# Patient Record
Sex: FEMALE | Race: OTHER | URBAN - METROPOLITAN AREA
[De-identification: names, ages, dates, MRNs, and addresses within clinical notes are randomized per-mention and may not be internally consistent; named-entity substitution may affect disease eponyms.]

---

## 2017-04-27 ENCOUNTER — OFFICE VISIT (OUTPATIENT)
Dept: FAMILY MEDICINE CLINIC | Age: 57
End: 2017-04-27

## 2017-04-27 VITALS
HEIGHT: 57 IN | SYSTOLIC BLOOD PRESSURE: 147 MMHG | HEART RATE: 65 BPM | DIASTOLIC BLOOD PRESSURE: 70 MMHG | TEMPERATURE: 98.4 F | WEIGHT: 117 LBS | BODY MASS INDEX: 25.24 KG/M2 | OXYGEN SATURATION: 99 %

## 2017-04-27 DIAGNOSIS — H04.121 DRY EYE OF RIGHT SIDE: Primary | ICD-10-CM

## 2017-04-27 DIAGNOSIS — S20.20XA CONTUSION OF THORACIC WALL, UNSPECIFIED AREA OF THORACIC WALL, INITIAL ENCOUNTER: ICD-10-CM

## 2017-04-27 RX ORDER — METHOCARBAMOL 500 MG/1
500 TABLET, FILM COATED ORAL 3 TIMES DAILY
Qty: 12 TAB | Refills: 1 | Status: SHIPPED | OUTPATIENT
Start: 2017-04-27

## 2017-04-27 RX ORDER — TETRAHYDROZOLINE HCL 0.05 %
1 DROPS OPHTHALMIC (EYE) 4 TIMES DAILY
Qty: 1 BOTTLE | Refills: 1 | Status: SHIPPED | OUTPATIENT
Start: 2017-04-27

## 2017-04-27 RX ORDER — NAPROXEN 500 MG/1
500 TABLET ORAL 2 TIMES DAILY WITH MEALS
Qty: 60 TAB | Refills: 0 | Status: SHIPPED | OUTPATIENT
Start: 2017-04-27

## 2017-04-27 NOTE — PROGRESS NOTES
Assessment/Plan:    Fadumo Willams was seen today for   Diagnoses and all orders for this visit:    Dry eye of right side  -     tetrahydrozoline (OPTI-CLEAR) 0.05 % ophthalmic solution; Administer 1 Drop to both eyes four (4) times daily. Contusion of thoracic wall, unspecified area of thoracic wall, initial encounter  -     methocarbamol (ROBAXIN) 500 mg tablet; Take 1 Tab by mouth three (3) times daily. [place] 1 pastilla 3 veces al monico por sims dolor muscular  -     naproxen (NAPROSYN) 500 mg tablet; Take 1 Tab by mouth two (2) times daily (with meals). [place] 1 pastilla 2 veces al monico con comida por sims dolor        Follow-up Disposition:  Return if symptoms worsen or fail to improve. ADRIANA Lemus expressed understanding of this plan. An AVS was printed and given to the patient.      ----------------------------------------------------------------------    Chief Complaint   Patient presents with    Neck Injury     pt c/o hitting the back of her neck with window    Other     pt c/o not being able close her right eye at night, pt has hx of facial paralysis       History of Present Illness:  Pt here for 2 reasons:  1. She feels that her right eye is not closing at night all the way and in the morning she has eye dryness. She does have remote hx of facial paralysis but she has NO acute sxs or signs of facial palsy and she agrees that this is not at all how she presented before. 2. 2 weeks ago, she was leaning back on her bed and she hit her upper t-spine on the edge of her window sill. Since then, she has had pain at this site and sometimes it seem that the pain goes to her anterior chest wall. She has been taking diclofenac with decent response. No pain with complete range of motion to her arms and neck        No past medical history on file.     Current Outpatient Prescriptions   Medication Sig Dispense Refill    methocarbamol (ROBAXIN) 500 mg tablet Take 1 Tab by mouth three (3) times daily. [place] 1 pastilla 3 veces al monico por sims dolor muscular 12 Tab 1    naproxen (NAPROSYN) 500 mg tablet Take 1 Tab by mouth two (2) times daily (with meals). [place] 1 pastilla 2 veces al monico con comida por sims dolor 60 Tab 0    tetrahydrozoline (OPTI-CLEAR) 0.05 % ophthalmic solution Administer 1 Drop to both eyes four (4) times daily. 1 Bottle 1       Allergies   Allergen Reactions    Pcn [Penicillins] Hives       Social History   Substance Use Topics    Smoking status: Never Smoker    Smokeless tobacco: None    Alcohol use No       No family history on file. Physical Exam:     Visit Vitals    /70 (BP 1 Location: Left arm)    Pulse 65    Temp 98.4 °F (36.9 °C) (Oral)    Ht 4' 9.2\" (1.453 m)    Wt 117 lb (53.1 kg)    SpO2 99%    BMI 25.14 kg/m2     gen pt looks well  A&Ox3  WDWN NAD  Respirations normal and non labored  Neuro: pt has NO facial asymmetry. She makes a perfect smile, she has no droop, her eyes are symmetrical, the eyelids do not lag on either eye. She has intact sensation and CN 2-12 grossly intact. Additionally, her eye has no redness or pus  Her upper t-spine is examined and she has no bruising or redness or warmth. She has tenderness, mild, with palpation over t-spine. No specific area of tenderness. She has no pain with neck or shoulder range of motion.  She has no anterior chest pain on palpation

## 2017-04-27 NOTE — PROGRESS NOTES
Printed AVS, provided to pt and reviewed. Pt indicated understanding and had no questions. Told pt that rx's have been sent to pharmacy and they should be ready for  in approximately 2 hrs. Told pt that rx's have been sent to pharmacy and they should be ready for  in approximately 2 hrs. Reviewed all medications ordered today with the pt. Lisha Daly was the .   Oumar Rowan RN

## 2017-04-27 NOTE — PROGRESS NOTES
RN spoke with pt with the assistance of , Sophie Gutierrez. Pt stated at intake that she wakes up in the morning and her right eye is not closed, and is very dry. She stated that she has a \"wierd\" feeling around that eye and also on the right side of her face. THis has been occurring for 4 days. She stated that she has had 2 episodes of facial paralysis, one 18 years ago and one 5 years ago. The feeling is the same as those times. She also stated that a window fell on her neck and is radiating to her chest for the 15 days since the injury occurred. The pain is there all the time. She has taken Diclofenac with some relief. RN consulted with the provider who advised that pt should go to the ED for evaluation of her neck injury. Pt is concerned about the cost. Explained the Care Card application process to pt and advised her to answer all calls from [facility] [employer] and [employer]. Also advised her to complete all requirements for the Care Card application, and to call the billing number on any bills she receives to advise that she needs to apply for the care card. Pt stated that she does not have a ride today to the ED, but may go tomorrow. She was given the address for [facility]. She will see the provider today.   Jaqui Wang RN

## 2017-04-27 NOTE — MR AVS SNAPSHOT
Visit Information Bing Delgadillogurjitfaiza Personal Médico Departamento Teléfono del Dep. Número de visita 4/27/2017 10:30 AM Scott Miller 848, [phone]  [phone] Upcoming Health Maintenance Date Due Hepatitis C Screening 1960 DTaP/Tdap/Td series (1 - Tdap) 11/9/1979 PAP AKA CERVICAL CYTOLOGY 11/9/1979 BREAST CANCER SCRN MAMMOGRAM 11/9/2008 FOBT Q 1 YEAR AGE 50-75 11/9/2008 INFLUENZA AGE 9 TO ADULT 8/1/2016 Alergias  Review Complete El: 4/27/2017 Por: Antonio Santos A partir del:  4/27/2017 Intensidad Anotado Tipo de reacción [facility] [Penicillins]  01/07/2016    Hives Vacunas actuales Revisadas el:  1/7/2016 Nuria Paredes Influenza Vaccine (Quad) PF 1/7/2016 No revisadas esta visita You Were Diagnosed With   
  
 Dionicio Jiménez Dry eye of right side    -  Primary ICD-10-CM: Y87.560 ICD-9-CM: 375.15 Contusion of thoracic wall, unspecified area of thoracic wall, initial encounter     ICD-10-CM: S20.20XA ICD-9-CM: 922.1 Partes vitales PS Pulso Temperatura [place] ( percentil de crecimiento) Peso (percentil de crecimiento) SpO2  
 147/70 (BP 1 Location: Left arm) 65 98.4 °F (36.9 °C) (Oral) 4' 9.2\" (1.453 m) 117 lb (53.1 kg) 99% BMI formerly [facility]) Estado obstétrico Estatus de tabaquísmo 25.14 kg/m2 Postmenopausal Never Smoker Historial de signos vitales BMI and BSA Data Body Mass Index Body Surface Area  
 25.14 kg/m 2 1.46 m 2 [facility] Name Phone [facility] [address] [facility] [phone] [place] lista de medicamentos actualizada Lista actualizada el: 4/27/17 11:23 AM.  Elieser Jay use otoole lista de medicamentos más reciente. methocarbamol 500 mg tablet También conocido augusto:  ROBAXIN  
 Take 1 Tab by mouth three (3) times daily. [place] 1 pastilla 3 veces al monico por sims dolor muscular  
  
 naproxen 500 mg tablet También conocido augusto:  NAPROSYN Take 1 Tab by mouth two (2) times daily (with meals). [place] 1 pastilla 2 veces al monico con comida por sims dolor  
  
 tetrahydrozoline 0.05 % ophthalmic solution También conocido augusto:  OPTI-CLEAR Administer 1 Drop to both eyes four (4) times daily. Recetas Enviado a la [place] Refills  
 methocarbamol (ROBAXIN) 500 mg tablet 1 Sig: Take 1 Tab by mouth three (3) times daily. [place] 1 pastilla 3 veces al monico por sims dolor muscular Class: Normal  
 Pharmacy: [address] Ph #: [phone] Route: Oral  
 naproxen (NAPROSYN) 500 mg tablet 0 Sig: Take 1 Tab by mouth two (2) times daily (with meals). [place] 1 pastilla 2 veces al monico con comida por sims dolor Class: Normal  
 Pharmacy: [address] Ph #: [phone] Route: Oral  
 tetrahydrozoline (OPTI-CLEAR) 0.05 % ophthalmic solution 1 Sig: Administer 1 Drop to both eyes four (4) times daily. Class: Normal  
 Pharmacy: [address] Ph #: [phone] Route: Both Eyes Instrucciones para el Paciente Sequedad de ojos: Instrucciones de cuidado - [ Dry Eyes: Care Instructions ] Instrucciones de cuidado Los ojos secos pueden ser incómodos. La sequedad puede hacer que annie ojos se sientan secos o calientes. Annie ojos también podrían estar muy llorosos. En algunos casos, tener ojos secos puede hacer que parezca augusto si hubiera arena o isabella en ellos. De vez en cuando, los ojos secos pueden hacer que usted tenga la visión borrosa. Cindy los ojos secos no suelen causar problemas de la vista a zeferino plazo. Hay muchas causas de la sequedad de ojos.  A veces, el clima seco, el humo o la contaminación pueden causar [employer]. Otras veces, las alergias o los lentes de contacto irritan los ojos. Las personas mayores suelen tener secos los ojos porque nuestros ojos no producen tantas lágrimas según envejecemos. En algunos casos, las enfermedades pueden causar Ronald Montemayor. Estas incluyen la artritis reumatoide, el lupus y el síndrome de Sjögren. En otros casos, los medicamentos son la causa. Sims médico podría hacerle pruebas para ayudar a encontrar la causa de sims sequedad de ojos. Usted puede colaborar con sims médico para encontrar maneras de ayudar a que pamela ojos se sientan mejor. El tratamiento en el hogar suele ayudar. La atención de seguimiento es jh parte clave de sims tratamiento y seguridad. Asegúrese de hacer y acudir a todas las citas, y llame a sims médico si está teniendo problemas. También es jh buena idea saber los resultados de los exámenes y mantener jh lista de los medicamentos que vira. Cómo puede cuidarse en el hogar? · Tg pausas frecuentes cuando dominick, rianna televisión o use la computadora. Cierre los ojos. No se frote los ojos. Las lágrimas artificiales podrían ser de ayuda cuando usted tg estas actividades. Se pueden comprar sin receta médica. · Evite el humo y otras cosas que irriten pamela ojos. · Use lentes de sol que envuelvan los lados de la deven. Pueden proteger los ojos del sol, el viento, el polvo y la Robert. · Utilice un vaporizador o humidificador para añadir humedad al aire de sims dormitorio. Siga las instrucciones para limpiar el aparato. · No use ventiladores mientras duerme. · Si utiliza lentes de contacto con frecuencia, use gotas humectantes o anteojos hasta que pamela ojos se sientan mejor. · Sea jewels con los medicamentos. [place] pamela medicamentos exactamente augusto le fueron recetados. Llame a sims médico si alysa estar teniendo problemas con sims medicamento. · Pruebe a usar lágrimas artificiales al menos 4 veces al día. · Si necesita gotas más de 4 veces al día, use lágrimas artificiales sin conservantes. Es posible que irriten [employer] ojos. · Use jh pomada o un gel lubricante para los ojos antes de [place]. Estos son Nancy Hopper y guerra [place], así que podría tener menos ardor, sequedad y comezón cuando se despierte. Tenga presente que podrían causarle visión borrosa por un tiempo breve. · Para ponerse pomada o gotas para los ojos: ¨ Incline la deven hacia atrás y, con un dedo, baje el párpado inferior. ¨ Deje caer las gotas o un chorrito del medicamento dentro del párpado inferior. ¨ Cierre el leonel kian 30 a 61 segundos para permitir que las gotas o la pomada se esparzan. ¨ No permita que la punta del tubo de [place] o del gotero toque annie pestañas ni ninguna otra superficie. · Colóquese un paño húmedo y tibio en los párpados todas las mañanas kian unos 5 minutos. Después, masajee los párpados ligeramente. Los [place] ayuda a aumentar la humectación natural de annie ojos. Cuándo debe pedir ayuda? Preste especial atención a los cambios en sims elisabeth y asegúrese de comunicarse con sims médico si: 
· Annie ojos siguen secos, irritados o llorosos, y las lágrimas artificiales no ayudan. · No mejora augusto se esperaba. Dónde puede encontrar más información en inglés? Fabiánalesia Marcial a http://hoang-jed.info/. Escriba D231 en la búsqueda para aprender más acerca de \"Sequedad de ojos: Instrucciones de cuidado - [ Dry Eyes: Care Instructions ]. \" 
Revisado: 23 [place], 2016 Versión del contenido: 11.2 © 6576-9195 Healthwise, Incorporated. Las instrucciones de cuidado fueron adaptadas bajo licencia por [employer] (which disclaims liability or warranty for this information). Si usted tiene [place] afección médica o sobre estas instrucciones, siempre pregunte a sims profesional de elisabeth. Healthwise, Incorporated niega toda garantía o responsabilidad por sims uso de esta información. Maria Eugenia musculares: Instrucciones de cuidado - [ Muscle Aches: Care Instructions ] Instrucciones de cuidado Los maria eugenia musculares tienen muchas causas posibles. Algunas causas comunes son Dene April, tensión y lesiones tales augusto jh distensión de un músculo. Jh infección augusto la gripe puede provocar maria eugenia musculares. O los maria eugenia pueden estar causados por [employer], [place]. Los [employer] [place] pueden ser un síntoma de jh enfermedad augusto el lupus o la fibromialgia. El término médico para los maria eugenia musculares es ORL[place]ANS. El médico le realizará un examen físico y le hará preguntas para tratar de determinar la causa del dolor. También es posible que le navdeep pruebas augusto análisis de bonifacio o pruebas por imágenes, augusto radiografías (debra X). Estas pueden ayudar a detectar o a descartar problemas graves. El médico lo higginbotham examinado minuciosamente, jason pueden presentarse problemas más tarde. Si nota algún problema o nuevos síntomas, busque tratamiento médico de inmediato. La atención de seguimiento es jh parte clave de sims tratamiento y seguridad. Asegúrese de hacer y acudir a todas las citas, y llame a sims médico si está teniendo problemas. También es jh buena idea saber los resultados de pamela exámenes y mantener jh lista de los medicamentos que vira. Cómo puede cuidarse en el hogar? · Descanse la jeannie que le duela. Es posible que deba interrumpir o disminuir la actividad que le provoca los síntomas. Más adelante, puede retomarla lentamente. · Colóquese hielo o jh compresa fría en la jeannie por entre 10 y 21 minutos cada vez para aliviar el dolor. Póngase un paño herrera entre el hielo y la piel. · Darrow un analgésico de venta bryn, augusto acetaminofén (Tylenol), ibuprofeno (Advil, Motrin) o naproxeno (Aleve). Sea jewels con los medicamentos. Elysia y siga todas las instrucciones de la [place]. Cuándo debe pedir ayuda? Llame a otoole médico ahora mismo o busque atención médica inmediata si: 
· [place] dolor empeora. · Tiene nuevos síntomas, augusto fiebre, hinchazón o un salpullido. Preste especial atención a los cambios en otoole elisabeth y asegúrese de comunicarse con otoole médico si: 
· No mejora augusto se esperaba. Dónde puede encontrar más información en inglés? Liza Moulton a http://hoang-jed.info/. Arnol Marie G355 en la búsqueda para aprender más acerca de \"Remedios musculares: Instrucciones de cuidado - [ Muscle Aches: Care Instructions ]. \" 
Revisado: 14 octubre, 2016 Versión del contenido: 11.2 © 6610-0440 Healthwise, Incorporated. Las instrucciones de cuidado fueron adaptadas bajo licencia por [employer] (which disclaims liability or warranty for this information). Si usted tiene [place] afección médica o sobre estas instrucciones, siempre pregunte a [place] profesional de elisabeth. Healthwise, Incorporated niega toda garantía o responsabilidad por otoole uso de esta información. Introducing [facility]! [facility] introduce portal paciente CellErahart . Ahora se puede acceder a partes de otoole expediente médico, enviar por correo electrónico la oficina de otoole médico y solicitar renovaciones de medicamentos en línea. En otoole navegador de Internet , Haseeb Shorten a [email or web address] en el usuario por James Tucker? Elayne plascencia aquí en la sesión [place]. Verá la página de registro [place]. Ingrese otoole código de Bank  Aanhi tiffany y augusto aparece a continuación. Usted no tendrá que [employer] código después de mariusz completado el proceso de registro . Si usted no se inscribe antes de la fecha de caducidad , debe solicitar un nuevo código. · MyChart Código de acceso : [id number] Expires: 7/26/2017 11:23 AM 
 
Ingresa los últimos cuatro dígitos de otoole Número de Seguro Social ( xxxx ) y fecha de nacimiento ( dd / mm / aaaa ) augusto se indica y tg clic en Enviar. Usted será llevado a la siguiente página de registro . Crear un ID MyChart . Esta será sims ID de inicio de sesión de MyChart y no puede ser [place] , por lo que pensar en jh que es Ilona Overlie y fácil de recordar . Crear jh contraseña MyChart . Usted puede cambiar sims contraseña en cualquier momento . Ingrese sims Password Reset de preguntas y [place] . [place] se puede utilizar en un momento posterior si usted olvida sims contraseña. Introduzca sims dirección de correo electrónico . Karely Garcia recibirá jh notificación por correo electrónico cuando la nueva información está disponible en MyChart . John Roblero clic en Registrarse. Ervin Bates sonido y descargar porciones de sims expediente médico. 
Gertrudis anthonyic en el enlace de descarga del menú Resumen para descargar jh copia portátil de sims información médica . Si tiene [employer] , por favor visite la sección de preguntas frecuentes del sitio web MyChart . Recuerde, MyChart NO es que se utilizará para las necesidades urgentes. Para emergencias médicas , llame al 911 . Ahora disponible en sims iPhone y Android ! Por favor proporcione katherine resumen de la documentación de cuidado a sims próximo proveedor. If you have any questions after today's visit, please call [phone].

## 2017-04-27 NOTE — PATIENT INSTRUCTIONS
Sequedad de ojos: Instrucciones de cuidado - [ Dry Eyes: Care Instructions ]  Instrucciones de cuidado    Los ojos secos pueden ser incómodos. La sequedad puede hacer que pamela ojos se sientan secos o calientes. Pamela ojos también podrían estar muy llorosos. En algunos casos, tener ojos secos puede hacer que parezca augusto si hubiera arena o isabella en ellos. De vez en cuando, los ojos secos pueden hacer que usted tenga la visión borrosa. Cindy los ojos secos no suelen causar problemas de la vista a zeferino plazo. Hay muchas causas de la sequedad de ojos. A veces, el clima seco, el humo o la contaminación pueden causar Mattel ojos. Otras veces, las alergias o los lentes de contacto irritan los ojos. Las personas mayores suelen tener secos los ojos porque nuestros ojos no producen tantas lágrimas según envejecemos. En algunos casos, las enfermedades pueden causar Ronald Montemayor. Estas incluyen la artritis reumatoide, el lupus y el síndrome de [place]. En otros casos, los medicamentos son la causa. Sims médico podría hacerle pruebas para ayudar a encontrar la causa de sims sequedad de ojos. Usted puede colaborar con sims médico para encontrar maneras de ayudar a que pamela ojos se sientan mejor. El tratamiento en el hogar suele ayudar. La atención de seguimiento es jh parte clave de sims tratamiento y seguridad. Asegúrese de hacer y acudir a todas las citas, y llame a sims médico si está teniendo problemas. También es jh buena idea saber los resultados de los exámenes y mantener jh lista de los medicamentos que vira. ¿Cómo puede cuidarse en el hogar? · Tg pausas frecuentes cuando dominick, rianna televisión o use la computadora. Cierre los ojos. No se frote los ojos. Las lágrimas artificiales podrían ser de ayuda cuando usted gt estas actividades. Se pueden comprar sin receta médica. · Evite el humo y otras cosas que irriten pamela ojos. · Use lentes de sol que envuelvan los lados de la deven.  Pueden proteger los ojos del sol, el viento, el polvo y la isabella. · Utilice un vaporizador o humidificador para añadir humedad al aire de sims dormitorio. Siga las instrucciones para limpiar el aparato. · No use ventiladores mientras duerme. · Si utiliza lentes de contacto con frecuencia, use gotas humectantes o anteojos hasta que annie ojos se sientan mejor. · Sea jewels con los medicamentos. [place] annie medicamentos exactamente augusto le fueron recetados. Llame a sims médico si alysa estar teniendo problemas con sims medicamento. · Pruebe a usar lágrimas artificiales al menos 4 veces al día. · Si necesita gotas más de 4 veces al día, use lágrimas artificiales sin conservantes. Es posible que irriten [employer] ojos. · Use jh pomada o un gel lubricante para los ojos antes de [place]. Estos son Mary Heard y guerra Pony, así que podría tener menos ardor, sequedad y comezón cuando se despierte. Tenga presente que podrían causarle visión borrosa por un tiempo breve. · Para ponerse pomada o gotas para los ojos:  ¨ Incline la [place] atrás y, con un dedo, baje el párpado inferior. ¨ Deje caer las gotas o un chorrito del medicamento dentro del párpado inferior. ¨ Cierre el leonel kian 30 a 61 segundos para permitir que las gotas o la pomada se esparzan. ¨ No permita que la punta del tubo de [place] o del gotero toque annie pestañas ni ninguna otra superficie. · Colóquese un paño húmedo y tibio en los párpados todas las mañanas kian unos 5 minutos. Después, masajee los párpados ligeramente. [place] ayuda a aumentar la humectación natural de annie ojos. ¿Cuándo debe pedir ayuda? Preste especial atención a los cambios en sims elisabeth y asegúrese de comunicarse con sims médico si:  · Annie ojos siguen secos, irritados o llorosos, y las lágrimas artificiales no ayudan. · No mejora augusto se esperaba. ¿Dónde puede encontrar más información en inglés? Lucía Flores a [email or web address]-[email or web address]   Escriba D231 en la búsqueda para aprender Pony acerca de \"Sequedad de ojos: Instrucciones de cuidado - [ Dry Eyes: Care Instructions ]. \"  Revisado: 23 erickson, 2016  Versión del contenido: 11.2  © 1216-6699 Healthwise, Incorporated. Las instrucciones de cuidado fueron adaptadas bajo licencia por [employer] (which disclaims liability or warranty for this information). Si usted tiene [place] afección médica o sobre estas instrucciones, siempre pregunte a sims profesional de elisabeth. Healthwise, Incorporated niega toda garantía o responsabilidad por sims uso de esta información. Maria Eugenia musculares: Instrucciones de cuidado - [ Muscle Aches: Care Instructions ]  Instrucciones de cuidado  Los maria eugenia musculares tienen muchas causas posibles. Algunas causas comunes son Rolena Downs, tensión y lesiones tales augusto jh distensión de un músculo. Jh infección augusto la gripe puede provocar maria eugenia musculares. O los maria eugenia pueden estar causados por [employer]. Los [employer] pueden ser un síntoma de jh enfermedad augusto el lupus o la fibromialgia. El término médico para los maria eugenia musculares es [place]. El médico le realizará un examen físico y le hará preguntas para tratar de determinar la causa del dolor. También es posible que le navdeep pruebas augusto análisis de bonifacio o pruebas por imágenes, augusto radiografías (debra X). Estas pueden ayudar a detectar o a descartar problemas graves. El médico lo higginbotham examinado minuciosamente, jason pueden presentarse problemas más tarde. Si nota algún problema o nuevos síntomas, busque tratamiento médico de inmediato. La atención de seguimiento es jh parte clave de sims tratamiento y seguridad. Asegúrese de hacer y acudir a todas las citas, y llame a sims médico si está teniendo problemas. También es jh buena idea saber los resultados de pamela exámenes y mantener jh lista de los medicamentos que vira. ¿Cómo puede cuidarse en el hogar? · Descanse la jeannie que le duela.  Es posible que deba interrumpir o disminuir la [employer] síntomas. Más adelante, puede retomarla lentamente. · Colóquese hielo o jh compresa fría en la jeannie por entre 10 y 21 minutos cada vez para aliviar el dolor. Póngase un paño herrera entre el hielo y la piel. · Leslie un analgésico de venta bryn, augusto acetaminofén (Tylenol), ibuprofeno (Advil, Motrin) o naproxeno (Aleve). Sea jewels con los medicamentos. Elysia y siga todas las instrucciones de la [place]. ¿Cuándo debe pedir ayuda? Llame a otoole médico ahora mismo o busque atención médica inmediata si:  · Otoole dolor empeora. · Tiene nuevos síntomas, augusto fiebre, hinchazón o un salpullido. Preste especial atención a los cambios en otoole elisabeth y asegúrese de comunicarse con otoole médico si:  · No mejora augusto se esperaba. ¿Dónde puede encontrar más información en inglés? Andrea Chakraborty a [email or web address]-jed.[email or web address] Char Zazueta [id number] en la búsqueda para aprender más acerca de \"Remedios musculares: Instrucciones de cuidado - [ Muscle Aches: Care Instructions ]. \"  Revisado: 14 octubre, 2016  Versión del contenido: 11.2  © 2821-6518 Healthwise, Incorporated. Las instrucciones de cuidado fueron adaptadas bajo licencia por [employer] (which disclaims liability or warranty for this information). Si usted tiene [place] afección médica o sobre estas instrucciones, siempre pregunte a otoole profesional de elisabeth. Healthwise, Incorporated niega toda garantía o responsabilidad por otoole uso de esta información.

## 2017-04-27 NOTE — PROGRESS NOTES
Coordination of Care  1. Have you been to the ER, urgent care clinic since your last visit? Hospitalized since your last visit? No    2. Have you seen or consulted any other health care providers outside of the [address] [place] [address] since your last visit? Include any pap smears or colon screening. No    Medications  Medication Reconciliation Performed: no  Patient does not   need refills     Learning Assessment Complete?  yes

## 2023-07-31 ENCOUNTER — HOSPITAL ENCOUNTER (OUTPATIENT)
Facility: HOSPITAL | Age: 63
Setting detail: SPECIMEN
Discharge: HOME | End: 2023-08-03

## 2023-07-31 PROCEDURE — 88175 CYTOPATH C/V AUTO FLUID REDO: CPT

## 2023-07-31 PROCEDURE — 87624 HPV HI-RISK TYP POOLED RSLT: CPT
